# Patient Record
Sex: MALE | Race: WHITE | NOT HISPANIC OR LATINO | Employment: OTHER | URBAN - METROPOLITAN AREA
[De-identification: names, ages, dates, MRNs, and addresses within clinical notes are randomized per-mention and may not be internally consistent; named-entity substitution may affect disease eponyms.]

---

## 2019-07-08 ENCOUNTER — HOSPITAL ENCOUNTER (OUTPATIENT)
Facility: CLINIC | Age: 44
Setting detail: OBSERVATION
Discharge: HOME | End: 2019-07-10
Attending: EMERGENCY MEDICINE | Admitting: SURGERY
Payer: COMMERCIAL

## 2019-07-08 ENCOUNTER — APPOINTMENT (OUTPATIENT)
Dept: CT IMAGING | Facility: CLINIC | Age: 44
End: 2019-07-08
Attending: EMERGENCY MEDICINE
Payer: COMMERCIAL

## 2019-07-08 DIAGNOSIS — K40.90 INGUINAL HERNIA OF RIGHT SIDE WITHOUT OBSTRUCTION OR GANGRENE: Primary | ICD-10-CM

## 2019-07-08 DIAGNOSIS — K40.90 NON-RECURRENT UNILATERAL INGUINAL HERNIA WITHOUT OBSTRUCTION OR GANGRENE: ICD-10-CM

## 2019-07-08 LAB
ALBUMIN SERPL-MCNC: 4.5 G/DL (ref 3.4–5)
ALP SERPL-CCNC: 58 U/L (ref 40–150)
ALT SERPL W P-5'-P-CCNC: 32 U/L (ref 0–70)
ANION GAP SERPL CALCULATED.3IONS-SCNC: 7 MMOL/L (ref 3–14)
AST SERPL W P-5'-P-CCNC: 14 U/L (ref 0–45)
BASOPHILS # BLD AUTO: 0 10E9/L (ref 0–0.2)
BASOPHILS NFR BLD AUTO: 0.1 %
BILIRUB SERPL-MCNC: 1.1 MG/DL (ref 0.2–1.3)
BUN SERPL-MCNC: 24 MG/DL (ref 7–30)
CALCIUM SERPL-MCNC: 9.6 MG/DL (ref 8.5–10.1)
CHLORIDE SERPL-SCNC: 105 MMOL/L (ref 94–109)
CO2 SERPL-SCNC: 26 MMOL/L (ref 20–32)
CREAT SERPL-MCNC: 0.86 MG/DL (ref 0.66–1.25)
DIFFERENTIAL METHOD BLD: NORMAL
EOSINOPHIL # BLD AUTO: 0 10E9/L (ref 0–0.7)
EOSINOPHIL NFR BLD AUTO: 0.2 %
ERYTHROCYTE [DISTWIDTH] IN BLOOD BY AUTOMATED COUNT: 13.1 % (ref 10–15)
GFR SERPL CREATININE-BSD FRML MDRD: >90 ML/MIN/{1.73_M2}
GLUCOSE SERPL-MCNC: 107 MG/DL (ref 70–99)
HCT VFR BLD AUTO: 46.7 % (ref 40–53)
HGB BLD-MCNC: 16.3 G/DL (ref 13.3–17.7)
IMM GRANULOCYTES # BLD: 0 10E9/L (ref 0–0.4)
IMM GRANULOCYTES NFR BLD: 0.2 %
LACTATE BLD-SCNC: 1.3 MMOL/L (ref 0.7–2)
LIPASE SERPL-CCNC: 135 U/L (ref 73–393)
LYMPHOCYTES # BLD AUTO: 0.8 10E9/L (ref 0.8–5.3)
LYMPHOCYTES NFR BLD AUTO: 8.3 %
MCH RBC QN AUTO: 30.7 PG (ref 26.5–33)
MCHC RBC AUTO-ENTMCNC: 34.9 G/DL (ref 31.5–36.5)
MCV RBC AUTO: 88 FL (ref 78–100)
MONOCYTES # BLD AUTO: 0.5 10E9/L (ref 0–1.3)
MONOCYTES NFR BLD AUTO: 5.5 %
NEUTROPHILS # BLD AUTO: 8.1 10E9/L (ref 1.6–8.3)
NEUTROPHILS NFR BLD AUTO: 85.7 %
NRBC # BLD AUTO: 0 10*3/UL
NRBC BLD AUTO-RTO: 0 /100
PLATELET # BLD AUTO: 282 10E9/L (ref 150–450)
POTASSIUM SERPL-SCNC: 3.4 MMOL/L (ref 3.4–5.3)
PROT SERPL-MCNC: 7.9 G/DL (ref 6.8–8.8)
RADIOLOGIST FLAGS: ABNORMAL
RBC # BLD AUTO: 5.31 10E12/L (ref 4.4–5.9)
SODIUM SERPL-SCNC: 138 MMOL/L (ref 133–144)
WBC # BLD AUTO: 9.5 10E9/L (ref 4–11)

## 2019-07-08 PROCEDURE — 25800030 ZZH RX IP 258 OP 636: Performed by: STUDENT IN AN ORGANIZED HEALTH CARE EDUCATION/TRAINING PROGRAM

## 2019-07-08 PROCEDURE — 25000132 ZZH RX MED GY IP 250 OP 250 PS 637: Performed by: EMERGENCY MEDICINE

## 2019-07-08 PROCEDURE — 86850 RBC ANTIBODY SCREEN: CPT | Performed by: SURGERY

## 2019-07-08 PROCEDURE — 74177 CT ABD & PELVIS W/CONTRAST: CPT

## 2019-07-08 PROCEDURE — 86901 BLOOD TYPING SEROLOGIC RH(D): CPT | Performed by: SURGERY

## 2019-07-08 PROCEDURE — G0378 HOSPITAL OBSERVATION PER HR: HCPCS

## 2019-07-08 PROCEDURE — 96374 THER/PROPH/DIAG INJ IV PUSH: CPT | Mod: 59

## 2019-07-08 PROCEDURE — 86900 BLOOD TYPING SEROLOGIC ABO: CPT | Performed by: SURGERY

## 2019-07-08 PROCEDURE — 83605 ASSAY OF LACTIC ACID: CPT | Performed by: EMERGENCY MEDICINE

## 2019-07-08 PROCEDURE — 85025 COMPLETE CBC W/AUTO DIFF WBC: CPT | Performed by: EMERGENCY MEDICINE

## 2019-07-08 PROCEDURE — 25000128 H RX IP 250 OP 636: Performed by: EMERGENCY MEDICINE

## 2019-07-08 PROCEDURE — 83690 ASSAY OF LIPASE: CPT | Performed by: EMERGENCY MEDICINE

## 2019-07-08 PROCEDURE — 25000125 ZZHC RX 250: Performed by: EMERGENCY MEDICINE

## 2019-07-08 PROCEDURE — 80053 COMPREHEN METABOLIC PANEL: CPT | Performed by: EMERGENCY MEDICINE

## 2019-07-08 PROCEDURE — 25000128 H RX IP 250 OP 636: Performed by: INTERNAL MEDICINE

## 2019-07-08 PROCEDURE — 99285 EMERGENCY DEPT VISIT HI MDM: CPT | Mod: Z6 | Performed by: EMERGENCY MEDICINE

## 2019-07-08 PROCEDURE — 99285 EMERGENCY DEPT VISIT HI MDM: CPT | Mod: 25

## 2019-07-08 RX ORDER — CHLORAL HYDRATE 500 MG
2 CAPSULE ORAL DAILY
COMMUNITY

## 2019-07-08 RX ORDER — HYDROMORPHONE HYDROCHLORIDE 1 MG/ML
0.5 INJECTION, SOLUTION INTRAMUSCULAR; INTRAVENOUS; SUBCUTANEOUS ONCE
Status: COMPLETED | OUTPATIENT
Start: 2019-07-08 | End: 2019-07-08

## 2019-07-08 RX ORDER — ACETAMINOPHEN 325 MG/1
650 TABLET ORAL EVERY 4 HOURS PRN
Status: DISCONTINUED | OUTPATIENT
Start: 2019-07-08 | End: 2019-07-10 | Stop reason: HOSPADM

## 2019-07-08 RX ORDER — HYDROCODONE BITARTRATE AND ACETAMINOPHEN 5; 325 MG/1; MG/1
1-2 TABLET ORAL EVERY 4 HOURS PRN
Status: DISCONTINUED | OUTPATIENT
Start: 2019-07-08 | End: 2019-07-10 | Stop reason: HOSPADM

## 2019-07-08 RX ORDER — KETOCONAZOLE 20 MG/G
CREAM TOPICAL 2 TIMES DAILY
COMMUNITY

## 2019-07-08 RX ORDER — LISINOPRIL AND HYDROCHLOROTHIAZIDE 12.5; 2 MG/1; MG/1
1 TABLET ORAL DAILY
COMMUNITY

## 2019-07-08 RX ORDER — SUCRALFATE 1 G/1
1 TABLET ORAL 4 TIMES DAILY
COMMUNITY

## 2019-07-08 RX ORDER — NALOXONE HYDROCHLORIDE 0.4 MG/ML
.1-.4 INJECTION, SOLUTION INTRAMUSCULAR; INTRAVENOUS; SUBCUTANEOUS
Status: DISCONTINUED | OUTPATIENT
Start: 2019-07-08 | End: 2019-07-10

## 2019-07-08 RX ORDER — SODIUM CHLORIDE, SODIUM LACTATE, POTASSIUM CHLORIDE, CALCIUM CHLORIDE 600; 310; 30; 20 MG/100ML; MG/100ML; MG/100ML; MG/100ML
INJECTION, SOLUTION INTRAVENOUS CONTINUOUS
Status: DISCONTINUED | OUTPATIENT
Start: 2019-07-08 | End: 2019-07-10 | Stop reason: HOSPADM

## 2019-07-08 RX ORDER — IOPAMIDOL 755 MG/ML
93 INJECTION, SOLUTION INTRAVASCULAR ONCE
Status: COMPLETED | OUTPATIENT
Start: 2019-07-08 | End: 2019-07-08

## 2019-07-08 RX ORDER — PANTOPRAZOLE SODIUM 40 MG/1
40 TABLET, DELAYED RELEASE ORAL DAILY
COMMUNITY

## 2019-07-08 RX ADMIN — LIDOCAINE HYDROCHLORIDE 30 ML: 20 SOLUTION ORAL; TOPICAL at 16:09

## 2019-07-08 RX ADMIN — HYDROMORPHONE HYDROCHLORIDE 0.5 MG: 1 INJECTION, SOLUTION INTRAMUSCULAR; INTRAVENOUS; SUBCUTANEOUS at 17:18

## 2019-07-08 RX ADMIN — SODIUM CHLORIDE, POTASSIUM CHLORIDE, SODIUM LACTATE AND CALCIUM CHLORIDE: 600; 310; 30; 20 INJECTION, SOLUTION INTRAVENOUS at 22:05

## 2019-07-08 RX ADMIN — IOPAMIDOL 93 ML: 755 INJECTION, SOLUTION INTRAVENOUS at 15:38

## 2019-07-08 ASSESSMENT — ENCOUNTER SYMPTOMS
APPETITE CHANGE: 1
COUGH: 0
ABDOMINAL PAIN: 1
VOMITING: 0
SHORTNESS OF BREATH: 0
LIGHT-HEADEDNESS: 1

## 2019-07-08 NOTE — ED PROVIDER NOTES
"    [place] EMERGENCY DEPARTMENT ([facility])  7/08/19   Hallway F 1:38 PM    History     Chief Complaint   Patient presents with     Abdominal Pain     The history is provided by the patient and medical records.     Isaías Fishman is a 43 year old male who presents with abdominal pain with lightheadedness. Patient notes he was recently diagnosed with GERD and esophagitis in April this year. He was started on omeprazole, pantoprazole and sucralfate for this but has had ongoing unbearable abdominal pain. He did have endoscopy on 5/22/19 at [facility] that showed esophagitis as well as small hiatal hernia. He continues to have reflux symptoms every other day. He came to the Emergency Department today because of bad abdominal pain making him nearly pass out, even while driving. He states it is embarrassing, \"it's acid reflux man, I should be able to handle it.\" He hasn't been able to eat due to abdominal discomfort. No known drug allergies. He states he has stopped drinking alcohol. No recreational drug use, no cigarette use.     Pt noted new swelling in the right groin over the weekend that resolved, then returned this morning when the pain became severe. No previous hx of inguinal hernias.     I have reviewed the Medications, Allergies, Past Medical and Surgical History, and Social History in the [employer] system.  PAST MEDICAL HISTORY:   Past Medical History:   Diagnosis Date     Hypertension        PAST SURGICAL HISTORY: History reviewed. No pertinent surgical history.    FAMILY HISTORY: History reviewed. No pertinent family history.    SOCIAL HISTORY:   Social History     Tobacco Use     Smoking status: Never Smoker   Substance Use Topics     Alcohol use: Not Currently     Comment: occasionally          No Known Allergies     Review of Systems   Constitutional: Positive for appetite change.   HENT: Negative.    Respiratory: Negative for cough and shortness of breath.    Cardiovascular: Negative for chest " pain.   Gastrointestinal: Positive for abdominal pain. Negative for vomiting.   Neurological: Positive for light-headedness.   All other systems reviewed and are negative.      Physical Exam   BP: 137/66  Pulse: (!) 36  Heart Rate: 82  Temp: 97.8  F (36.6  C)  Resp: 18  Weight: 67.9 kg (149 lb 11.2 oz)  SpO2: 97 %      Physical Exam  Gen:A&Ox3, no acute distress, sitting in wheelchair   HEENT:PERRL, no facial tenderness or wounds, head atraumatic, oropharynx clear, mucous membranes moist, TMs clear bilaterally  Neck:no bony tenderness or step offs, no JVD, trachea midline  Back: no CVA tenderness, no midline bony tenderness  CV:RRR without murmurs  PULM:Clear to auscultation bilaterally  Abd:soft tenderness at umbilicus and suprapubic area  : Large right inguinal hernia that is firm but without skin changes  UE:No traumatic injuries, skin normal  LE:no traumatic injuries, skin normal, no LE edema  Neuro: partial paralysis of the LE   Skin: no rashes or ecchymoses       ED Course        Procedures             Critical Care time:  none             Labs Ordered and Resulted from Time of ED Arrival Up to the Time of Departure from the ED   COMPREHENSIVE METABOLIC PANEL - Abnormal; Notable for the following components:       Result Value    Glucose 107 (*)     All other components within normal limits   CBC WITH PLATELETS DIFFERENTIAL   LACTIC ACID WHOLE BLOOD   LIPASE   ROUTINE UA WITH MICROSCOPIC REFLEX TO CULTURE   FREE WATER            Assessments & Plan (with Medical Decision Making)   43-year-old male with history of cerebral palsy and GERD presented with severe abdominal pain associated with groin swelling.  Physical exam does not suggest an acute testicular mass or torsion.   Large right inguinal hernia.   Attempt at reduction unsuccessful initially.  Treated with ice, Trendelenburg position, IV Dilaudid.   CT abdomen pelvis shows narrow necked inguinal hernia with herniation of the colon.  No significant bowel  wall findings of ischemia or free air.   CBC and conference metabolic panel normal.  Lactic acid 1.3.  Surgery was consulted and evaluated the patient in the emergency department.  His hernia was able to be reduced.  Admitted to the general surgery service with plan for operative repair tomorrow.    I have reviewed the nursing notes.    I have reviewed the findings, diagnosis, plan and need for follow up with the patient.       Medication List      There are no discharge medications for this visit.         Final diagnoses:   Non-recurrent unilateral inguinal hernia without obstruction or gangrene   I, Ruba Elena, am serving as a trained medical scribe to document services personally performed by Gia Bowie MD based on the provider's statements to me on July 8, 2019.  This document has been checked and approved by the attending provider.    I, Gia Bowie MD, was physically present and have reviewed and verified the accuracy of this note documented by Ruba Elena, medical scribe.       7/8/2019   [facility] [place], EMERGENCY DEPARTMENT    MD BABAK Roger Katrina Anne, MD  07/08/19 [id number]

## 2019-07-08 NOTE — LETTER
[address] [facility]  [address] [facility] [address]  Phone: [phone]    July 10, 2019        Isaías Fishman  [address]  [address]  [place] [address]          To whom it may concern:    RE: Isaías Fishman    Patient was seen and treated at [facility] and missed work. Patient is safe to return to work next week (07/15/2019).  Patient may return to work  with the following:  Light duty-unable to lift more than 10 pounds    Please contact me for questions or concerns.      Sincerely,    Rico Do MD  General Surgery       No name on file.

## 2019-07-08 NOTE — CONSULTS
Surgery Consultation Note    Isaías Fishman  MRN: [id number]  male  43 year old    Chief Complaint:  Right groin pain    HPI:  43 year old male with hx cerebral palsy and reducible right sided inguinal hernia , GERD, who presented with 1-2 days of groin pain. The pain is localized in right groin. He had an inguinal hernia, which was spontaneously reducible yesterday, however, this morning he had a recurrence of hernia and was irreducible. He is been having nausea but no emesis. His last BM was yesterday and has not passed gas since this morning. He has not been eating or drinking anything due to persistent groin pain. He denies any fevers, night sweats or chills. No chest pain, sob, constipation/diarrhea or urinary symptoms.  Of note, he was recently diagnosed with GERD and esophagitis (endoscopic evidence of esophagitis) for which he is taking PPIs.    There is no problem list on file for this patient.      Past Surgical History: History reviewed. No pertinent surgical history.    Past Medical History:   Past Medical History:   Diagnosis Date     Hypertension        Social History:   Social History     Tobacco Use     Smoking status: Never Smoker   Substance Use Topics     Alcohol use: Not Currently     Comment: occasionally       Family History: History reviewed. No pertinent family history.     Allergies: No Known Allergies    Active Medications:   No current outpatient medications on file.       ROS:  Otherwise negative    Physical Examination:   Vital Signs: /71   Pulse 76   Temp 97.8  F (36.6  C)   Resp 18   SpO2 99%   GEN: alert, anxious, mild distress  EENT: normal  Chest: NLB on room air, regular rate  Abdomen: soft, non-tender, mildly-distended  : right sided inguinal hernia in the scrotal sac. No defect or hernia noted on the left side.   Extremities: WWP, no edema    Labs/Imaging/Other:  Most Recent 3 CBC's:  Recent Labs   Lab Test 07/08/19  1353   WBC 9.5   HGB 16.3   MCV 88         Most Recent 3 BMP's:  Recent Labs   Lab Test 07/08/19  1353      POTASSIUM 3.4   CHLORIDE 105   CO2 26   BUN 24   CR 0.86   ANIONGAP 7   HÉCTOR 9.6   *     Most Recent 2 LFT's:  Recent Labs   Lab Test 07/08/19  1353   AST 14   ALT 32   ALKPHOS 58   BILITOTAL 1.1       Admitting Diagnosis:   1. Non-recurrent unilateral inguinal hernia without obstruction or gangrene        Assessment & Plan:  43 year old male hx of cerebral palsy and reducible right sided inguinal hernias presented with irreducible right inguinal hernia with CT findings concerning for incarcerated hernia involving transverse colon.     - Surgery was able to reduce the hernia at bedside.   - Admit to ED observation  - Surgery will attempt to do serial abdominal exams every 6 hrs  - Okay to have clears for now. NPO at midnight.   - OR tomorrow for surgical intervention open right inguinal hernia repair      D/w chief resident Dr. Georges who will discuss with staff Dr. Jose Alberto Do MD  General Surgery PGY-2  [phone]

## 2019-07-08 NOTE — ED NOTES
[facility] [place]   ED Nurse to Floor Handoff     Isaías Fishman is a 43 year old male who speaks English and lives alone,  in a home  They arrived in the ED by car from home    ED Chief Complaint: Abdominal Pain    ED Dx;   Final diagnoses:   Non-recurrent unilateral inguinal hernia without obstruction or gangrene         Needed?: No    Allergies: No Known Allergies.  Past Medical Hx:   Past Medical History:   Diagnosis Date     Hypertension       Baseline Mental status: WDL  Current Mental Status changes: at basesline    Infection present or suspected this encounter: no  Sepsis suspected: No  Isolation type: No active isolations     Activity level - Baseline/Home:  Wheelchair  Activity Level - Current:   Wheelchair    Bariatric equipment needed?: No    In the ED these meds were given:   Medications   lidocaine HCl (XYLOCAINE) 2 % 15 mL, alum & mag hydroxide-simethicone (MYLANTA ES/MAALOX  ES) 15 mL GI Cocktail (30 mLs Oral Given 7/8/19 1609)   sodium chloride (PF) 0.9% PF flush 73 mL (73 mLs Intravenous Given 7/8/19 1538)   iopamidol (ISOVUE-370) solution 93 mL (93 mLs Intravenous Given 7/8/19 1538)   HYDROmorphone (PF) (DILAUDID) injection 0.5 mg (0.5 mg Intravenous Given 7/8/19 1718)       Drips running?  No    Home pump  No    Current LDAs  Peripheral IV 07/08/19 Left Lower forearm (Active)   Site Assessment WDL 7/8/2019  1:53 PM   Line Status Saline locked 7/8/2019  1:53 PM   Number of days: 0       Labs results:   Labs Ordered and Resulted from Time of ED Arrival Up to the Time of Departure from the ED   COMPREHENSIVE METABOLIC PANEL - Abnormal; Notable for the following components:       Result Value    Glucose 107 (*)     All other components within normal limits   CBC WITH PLATELETS DIFFERENTIAL   LACTIC ACID WHOLE BLOOD   LIPASE   ROUTINE UA WITH MICROSCOPIC REFLEX TO CULTURE   FREE WATER       Imaging Studies:   Recent Results (from the past 24 hour(s))   CT  Abdomen Pelvis w Contrast   Result Value    Radiologist flags See impression (Urgent)    Narrative    EXAMINATION: CT ABDOMEN PELVIS W CONTRAST, 7/8/2019 3:49 PM    TECHNIQUE:  Helical CT images from the lung bases through the  symphysis pubis were obtained with IV contrast. Contrast dose:  iopamidol (ISOVUE-370) solution 93 mL    COMPARISON: None available    HISTORY: mid abdominal pain    FINDINGS:    There is a large right indirect inguinal hernia containing a  borderline dilated knuckle of transverse colon measuring up to 4.2 cm  as well as omentum. There is a small amount of ascites within the  hernia sac. There is feculent material with mucosal hyperenhancement  of the herniated loop. This loop of bowel appears to tether the  mesocolon as well as the gastric antrum/pylorus inferiorly is  demonstrated on series 3, image 28. The transition points of the  entrance and extra loops appear to be narrowed as demonstrated on  series 5, image 370. Hernia neck measures 3.1 cm in width. There are  no significantly dilated loops of bowel upstream from this bowel  herniation. Small amount of fluid seen in the hernia sac. No  pneumatosis.    Remainder of the stomach and colon are within normal limits. Small  bowel appears normal.    Liver: Normal parenchymal attenuation without focal mass.  Biliary system: Gallbladder is within normal limits. No intrahepatic  or extrahepatic biliary ductal dilatation.  Pancreas: No focal mass or dilation of the main pancreatic duct.  Spleen: Within normal limits.  Adrenal glands: Within normal limits.  Kidneys: No focal mass mass or hydronephrosis. Punctate calyceal tip  stone in the superior and interpolar regions of the right kidney.  Bladder: Within normal limits.  Reproductive organs: Within normal limits.  Appendix: Normal.  Lymph nodes: No intra-abdominal or pelvic lymphadenopathy.  Vasculature: Within normal limits.    Lung bases: Clear.    Bones and soft tissues: No suspicious soft  tissue mass or fluid  collection. No suspicious osseous lesion.      Impression    IMPRESSION:   1. Right-sided indirect inguinal hernia containing a loop of  transverse colon producing a significant amount of inferior tethering  of the mesocolon and small amount of fluid indicating inflammation.  Correlate for incarceration.    [Urgent Result: See impression]    Finding was identified on 7/8/2019 3:53 PM.     Dr. Witt was contacted by Dr. Hernandez at 7/8/2019 4:09 PM and  verbalized understanding of the urgent finding.     I have personally reviewed the examination and initial interpretation  and I agree with the findings.    QUITA CALLAHAN MD       Recent vital signs:   /71   Pulse 76   Temp 97.8  F (36.6  C)   Resp 18   SpO2 99%     [place] Coma Scale Score: 15 (07/08/19 1341)       Cardiac Rhythm: Normal Sinus  Pt needs tele? No  Skin/wound Issues: None    Code Status: Full Code    Pain control: good    Nausea control: good    Abnormal labs/tests/findings requiring intervention: CT shows inguinal hernia    Family present during ED course? No   Family Comments/Social Situation comments: Pt is wheelchair bound at baseline and is independent and capable in it.    Tasks needing completion: None    Kevon Rivera, RN    [phone] [facility]

## 2019-07-08 NOTE — ED TRIAGE NOTES
"acid reflux causing \"ripping\" abd pain     VSS    \"hurts so bad thought I was going to passout \"  "

## 2019-07-09 ENCOUNTER — ANESTHESIA (OUTPATIENT)
Dept: SURGERY | Facility: CLINIC | Age: 44
End: 2019-07-09
Payer: COMMERCIAL

## 2019-07-09 ENCOUNTER — ANESTHESIA EVENT (OUTPATIENT)
Dept: SURGERY | Facility: CLINIC | Age: 44
End: 2019-07-09
Payer: COMMERCIAL

## 2019-07-09 PROBLEM — K40.90 INGUINAL HERNIA OF RIGHT SIDE WITHOUT OBSTRUCTION OR GANGRENE: Status: ACTIVE | Noted: 2019-07-09

## 2019-07-09 LAB
ABO + RH BLD: NORMAL
ABO + RH BLD: NORMAL
ALBUMIN UR-MCNC: 10 MG/DL
APPEARANCE UR: CLEAR
BILIRUB UR QL STRIP: NEGATIVE
BLD GP AB SCN SERPL QL: NORMAL
BLOOD BANK CMNT PATIENT-IMP: NORMAL
COLOR UR AUTO: YELLOW
GLUCOSE BLDC GLUCOMTR-MCNC: 71 MG/DL (ref 70–99)
GLUCOSE UR STRIP-MCNC: NEGATIVE MG/DL
HGB UR QL STRIP: NEGATIVE
KETONES UR STRIP-MCNC: >150 MG/DL
LEUKOCYTE ESTERASE UR QL STRIP: NEGATIVE
MUCOUS THREADS #/AREA URNS LPF: PRESENT /LPF
NITRATE UR QL: NEGATIVE
PH UR STRIP: 6.5 PH (ref 5–7)
RBC #/AREA URNS AUTO: 1 /HPF (ref 0–2)
SOURCE: ABNORMAL
SP GR UR STRIP: 1.01 (ref 1–1.03)
SPECIMEN EXP DATE BLD: NORMAL
UROBILINOGEN UR STRIP-MCNC: NORMAL MG/DL (ref 0–2)
WBC #/AREA URNS AUTO: ABNORMAL /HPF (ref 0–5)

## 2019-07-09 PROCEDURE — C9290 INJ, BUPIVACAINE LIPOSOME: HCPCS | Performed by: ANESTHESIOLOGY

## 2019-07-09 PROCEDURE — 37000009 ZZH ANESTHESIA TECHNICAL FEE, EACH ADDTL 15 MIN: Performed by: SURGERY

## 2019-07-09 PROCEDURE — 25000566 ZZH SEVOFLURANE, EA 15 MIN: Performed by: SURGERY

## 2019-07-09 PROCEDURE — 25000132 ZZH RX MED GY IP 250 OP 250 PS 637: Performed by: STUDENT IN AN ORGANIZED HEALTH CARE EDUCATION/TRAINING PROGRAM

## 2019-07-09 PROCEDURE — 25000128 H RX IP 250 OP 636

## 2019-07-09 PROCEDURE — 25000128 H RX IP 250 OP 636: Performed by: NURSE ANESTHETIST, CERTIFIED REGISTERED

## 2019-07-09 PROCEDURE — 25000128 H RX IP 250 OP 636: Performed by: ANESTHESIOLOGY

## 2019-07-09 PROCEDURE — G0378 HOSPITAL OBSERVATION PER HR: HCPCS

## 2019-07-09 PROCEDURE — 00000146 ZZHCL STATISTIC GLUCOSE BY METER IP

## 2019-07-09 PROCEDURE — 27210794 ZZH OR GENERAL SUPPLY STERILE: Performed by: SURGERY

## 2019-07-09 PROCEDURE — 36000053 ZZH SURGERY LEVEL 2 EA 15 ADDTL MIN - UMMC: Performed by: SURGERY

## 2019-07-09 PROCEDURE — 71000015 ZZH RECOVERY PHASE 1 LEVEL 2 EA ADDTL HR: Performed by: SURGERY

## 2019-07-09 PROCEDURE — 25000125 ZZHC RX 250: Performed by: NURSE ANESTHETIST, CERTIFIED REGISTERED

## 2019-07-09 PROCEDURE — 37000008 ZZH ANESTHESIA TECHNICAL FEE, 1ST 30 MIN: Performed by: SURGERY

## 2019-07-09 PROCEDURE — 71000014 ZZH RECOVERY PHASE 1 LEVEL 2 FIRST HR: Performed by: SURGERY

## 2019-07-09 PROCEDURE — 25800030 ZZH RX IP 258 OP 636: Performed by: STUDENT IN AN ORGANIZED HEALTH CARE EDUCATION/TRAINING PROGRAM

## 2019-07-09 PROCEDURE — 88302 TISSUE EXAM BY PATHOLOGIST: CPT | Performed by: SURGERY

## 2019-07-09 PROCEDURE — 25800030 ZZH RX IP 258 OP 636: Performed by: ANESTHESIOLOGY

## 2019-07-09 PROCEDURE — 12000001 ZZH R&B MED SURG/OB UMMC

## 2019-07-09 PROCEDURE — 40000170 ZZH STATISTIC PRE-PROCEDURE ASSESSMENT II: Performed by: SURGERY

## 2019-07-09 PROCEDURE — C1781 MESH (IMPLANTABLE): HCPCS | Performed by: SURGERY

## 2019-07-09 PROCEDURE — 36000051 ZZH SURGERY LEVEL 2 1ST 30 MIN - UMMC: Performed by: SURGERY

## 2019-07-09 PROCEDURE — 81001 URINALYSIS AUTO W/SCOPE: CPT | Performed by: EMERGENCY MEDICINE

## 2019-07-09 DEVICE — MESH KNITTED POLYPROPYLENE 06X6" MARLEX 0112720: Type: IMPLANTABLE DEVICE | Site: GROIN | Status: FUNCTIONAL

## 2019-07-09 RX ORDER — GLYCOPYRROLATE 0.2 MG/ML
INJECTION, SOLUTION INTRAMUSCULAR; INTRAVENOUS PRN
Status: DISCONTINUED | OUTPATIENT
Start: 2019-07-09 | End: 2019-07-09

## 2019-07-09 RX ORDER — OXYCODONE HYDROCHLORIDE 5 MG/1
5-10 TABLET ORAL
Status: DISCONTINUED | OUTPATIENT
Start: 2019-07-09 | End: 2019-07-10 | Stop reason: HOSPADM

## 2019-07-09 RX ORDER — ONDANSETRON 4 MG/1
4 TABLET, ORALLY DISINTEGRATING ORAL EVERY 30 MIN PRN
Status: DISCONTINUED | OUTPATIENT
Start: 2019-07-09 | End: 2019-07-09 | Stop reason: HOSPADM

## 2019-07-09 RX ORDER — ONDANSETRON 2 MG/ML
INJECTION INTRAMUSCULAR; INTRAVENOUS PRN
Status: DISCONTINUED | OUTPATIENT
Start: 2019-07-09 | End: 2019-07-09

## 2019-07-09 RX ORDER — ONDANSETRON 4 MG/1
4 TABLET, ORALLY DISINTEGRATING ORAL EVERY 6 HOURS PRN
Status: DISCONTINUED | OUTPATIENT
Start: 2019-07-09 | End: 2019-07-10 | Stop reason: HOSPADM

## 2019-07-09 RX ORDER — MEPERIDINE HYDROCHLORIDE 25 MG/ML
12.5 INJECTION INTRAMUSCULAR; INTRAVENOUS; SUBCUTANEOUS
Status: DISCONTINUED | OUTPATIENT
Start: 2019-07-09 | End: 2019-07-09 | Stop reason: HOSPADM

## 2019-07-09 RX ORDER — FENTANYL CITRATE 50 UG/ML
25-50 INJECTION, SOLUTION INTRAMUSCULAR; INTRAVENOUS
Status: DISCONTINUED | OUTPATIENT
Start: 2019-07-09 | End: 2019-07-09 | Stop reason: HOSPADM

## 2019-07-09 RX ORDER — NALOXONE HYDROCHLORIDE 0.4 MG/ML
.1-.4 INJECTION, SOLUTION INTRAMUSCULAR; INTRAVENOUS; SUBCUTANEOUS
Status: DISCONTINUED | OUTPATIENT
Start: 2019-07-09 | End: 2019-07-09 | Stop reason: HOSPADM

## 2019-07-09 RX ORDER — CEFAZOLIN SODIUM 2 G/100ML
2 INJECTION, SOLUTION INTRAVENOUS
Status: DISCONTINUED | OUTPATIENT
Start: 2019-07-09 | End: 2019-07-10 | Stop reason: HOSPADM

## 2019-07-09 RX ORDER — CEFAZOLIN SODIUM 2 G/100ML
INJECTION, SOLUTION INTRAVENOUS PRN
Status: DISCONTINUED | OUTPATIENT
Start: 2019-07-09 | End: 2019-07-09

## 2019-07-09 RX ORDER — ACETAMINOPHEN 325 MG/1
975 TABLET ORAL EVERY 8 HOURS
Status: DISCONTINUED | OUTPATIENT
Start: 2019-07-09 | End: 2019-07-10 | Stop reason: HOSPADM

## 2019-07-09 RX ORDER — ONDANSETRON 2 MG/ML
4 INJECTION INTRAMUSCULAR; INTRAVENOUS EVERY 6 HOURS PRN
Status: DISCONTINUED | OUTPATIENT
Start: 2019-07-09 | End: 2019-07-10 | Stop reason: HOSPADM

## 2019-07-09 RX ORDER — HYDROCODONE BITARTRATE AND ACETAMINOPHEN 5; 325 MG/1; MG/1
1 TABLET ORAL EVERY 4 HOURS PRN
Qty: 10 TABLET | Refills: 0 | Status: SHIPPED | OUTPATIENT
Start: 2019-07-09

## 2019-07-09 RX ORDER — FLUMAZENIL 0.1 MG/ML
0.2 INJECTION, SOLUTION INTRAVENOUS
Status: DISCONTINUED | OUTPATIENT
Start: 2019-07-09 | End: 2019-07-09 | Stop reason: HOSPADM

## 2019-07-09 RX ORDER — DEXAMETHASONE SODIUM PHOSPHATE 10 MG/ML
INJECTION, SOLUTION INTRAMUSCULAR; INTRAVENOUS PRN
Status: DISCONTINUED | OUTPATIENT
Start: 2019-07-09 | End: 2019-07-09

## 2019-07-09 RX ORDER — ONDANSETRON 2 MG/ML
4 INJECTION INTRAMUSCULAR; INTRAVENOUS EVERY 30 MIN PRN
Status: DISCONTINUED | OUTPATIENT
Start: 2019-07-09 | End: 2019-07-09 | Stop reason: HOSPADM

## 2019-07-09 RX ORDER — FENTANYL CITRATE 50 UG/ML
INJECTION, SOLUTION INTRAMUSCULAR; INTRAVENOUS PRN
Status: DISCONTINUED | OUTPATIENT
Start: 2019-07-09 | End: 2019-07-09

## 2019-07-09 RX ORDER — LIDOCAINE HYDROCHLORIDE 20 MG/ML
INJECTION, SOLUTION INFILTRATION; PERINEURAL PRN
Status: DISCONTINUED | OUTPATIENT
Start: 2019-07-09 | End: 2019-07-09

## 2019-07-09 RX ORDER — LIDOCAINE 40 MG/G
CREAM TOPICAL
Status: DISCONTINUED | OUTPATIENT
Start: 2019-07-09 | End: 2019-07-10 | Stop reason: HOSPADM

## 2019-07-09 RX ORDER — PROPOFOL 10 MG/ML
INJECTION, EMULSION INTRAVENOUS PRN
Status: DISCONTINUED | OUTPATIENT
Start: 2019-07-09 | End: 2019-07-09

## 2019-07-09 RX ORDER — CEFAZOLIN SODIUM 1 G/3ML
1 INJECTION, POWDER, FOR SOLUTION INTRAMUSCULAR; INTRAVENOUS SEE ADMIN INSTRUCTIONS
Status: DISCONTINUED | OUTPATIENT
Start: 2019-07-09 | End: 2019-07-10 | Stop reason: HOSPADM

## 2019-07-09 RX ORDER — MICONAZOLE NITRATE 20 MG/G
CREAM TOPICAL 2 TIMES DAILY PRN
Status: DISCONTINUED | OUTPATIENT
Start: 2019-07-09 | End: 2019-07-10 | Stop reason: HOSPADM

## 2019-07-09 RX ORDER — HYDROMORPHONE HYDROCHLORIDE 1 MG/ML
.3-.5 INJECTION, SOLUTION INTRAMUSCULAR; INTRAVENOUS; SUBCUTANEOUS EVERY 10 MIN PRN
Status: DISCONTINUED | OUTPATIENT
Start: 2019-07-09 | End: 2019-07-09 | Stop reason: HOSPADM

## 2019-07-09 RX ORDER — KETOCONAZOLE 20 MG/G
CREAM TOPICAL 2 TIMES DAILY PRN
Status: DISCONTINUED | OUTPATIENT
Start: 2019-07-09 | End: 2019-07-09

## 2019-07-09 RX ORDER — SODIUM CHLORIDE, SODIUM LACTATE, POTASSIUM CHLORIDE, CALCIUM CHLORIDE 600; 310; 30; 20 MG/100ML; MG/100ML; MG/100ML; MG/100ML
INJECTION, SOLUTION INTRAVENOUS CONTINUOUS
Status: DISCONTINUED | OUTPATIENT
Start: 2019-07-09 | End: 2019-07-09 | Stop reason: HOSPADM

## 2019-07-09 RX ORDER — BUPIVACAINE HYDROCHLORIDE 2.5 MG/ML
INJECTION, SOLUTION EPIDURAL; INFILTRATION; INTRACAUDAL PRN
Status: DISCONTINUED | OUTPATIENT
Start: 2019-07-09 | End: 2019-07-09

## 2019-07-09 RX ORDER — NALOXONE HYDROCHLORIDE 0.4 MG/ML
.1-.4 INJECTION, SOLUTION INTRAMUSCULAR; INTRAVENOUS; SUBCUTANEOUS
Status: DISCONTINUED | OUTPATIENT
Start: 2019-07-09 | End: 2019-07-10 | Stop reason: HOSPADM

## 2019-07-09 RX ADMIN — FENTANYL CITRATE 50 MCG: 50 INJECTION INTRAMUSCULAR; INTRAVENOUS at 12:51

## 2019-07-09 RX ADMIN — HYDROMORPHONE HYDROCHLORIDE 0.5 MG: 1 INJECTION, SOLUTION INTRAMUSCULAR; INTRAVENOUS; SUBCUTANEOUS at 13:15

## 2019-07-09 RX ADMIN — BUPIVACAINE 20 ML: 13.3 INJECTION, SUSPENSION, LIPOSOMAL INFILTRATION at 09:20

## 2019-07-09 RX ADMIN — SODIUM CHLORIDE, POTASSIUM CHLORIDE, SODIUM LACTATE AND CALCIUM CHLORIDE: 600; 310; 30; 20 INJECTION, SOLUTION INTRAVENOUS at 07:43

## 2019-07-09 RX ADMIN — FENTANYL CITRATE 50 MCG: 50 INJECTION INTRAMUSCULAR; INTRAVENOUS at 12:45

## 2019-07-09 RX ADMIN — SODIUM CHLORIDE, POTASSIUM CHLORIDE, SODIUM LACTATE AND CALCIUM CHLORIDE: 600; 310; 30; 20 INJECTION, SOLUTION INTRAVENOUS at 21:30

## 2019-07-09 RX ADMIN — SODIUM CHLORIDE, POTASSIUM CHLORIDE, SODIUM LACTATE AND CALCIUM CHLORIDE 100 ML/HR: 600; 310; 30; 20 INJECTION, SOLUTION INTRAVENOUS at 12:30

## 2019-07-09 RX ADMIN — ONDANSETRON 4 MG: 2 INJECTION INTRAMUSCULAR; INTRAVENOUS at 09:56

## 2019-07-09 RX ADMIN — GLYCOPYRROLATE 0.1 MG: 0.2 INJECTION, SOLUTION INTRAMUSCULAR; INTRAVENOUS at 10:20

## 2019-07-09 RX ADMIN — FENTANYL CITRATE 50 MCG: 50 INJECTION, SOLUTION INTRAMUSCULAR; INTRAVENOUS at 12:15

## 2019-07-09 RX ADMIN — MIDAZOLAM 1 MG: 1 INJECTION INTRAMUSCULAR; INTRAVENOUS at 09:38

## 2019-07-09 RX ADMIN — ROCURONIUM BROMIDE 50 MG: 10 INJECTION INTRAVENOUS at 09:38

## 2019-07-09 RX ADMIN — ACETAMINOPHEN 975 MG: 325 TABLET, FILM COATED ORAL at 14:48

## 2019-07-09 RX ADMIN — LIDOCAINE HYDROCHLORIDE 100 MG: 20 INJECTION, SOLUTION INFILTRATION; PERINEURAL at 09:38

## 2019-07-09 RX ADMIN — OXYCODONE HYDROCHLORIDE 5 MG: 5 TABLET ORAL at 18:00

## 2019-07-09 RX ADMIN — OXYCODONE HYDROCHLORIDE 5 MG: 5 TABLET ORAL at 14:48

## 2019-07-09 RX ADMIN — HYDROMORPHONE HYDROCHLORIDE 0.5 MG: 1 INJECTION, SOLUTION INTRAMUSCULAR; INTRAVENOUS; SUBCUTANEOUS at 13:00

## 2019-07-09 RX ADMIN — FENTANYL CITRATE 50 MCG: 50 INJECTION INTRAMUSCULAR; INTRAVENOUS at 09:08

## 2019-07-09 RX ADMIN — MIDAZOLAM 1 MG: 1 INJECTION INTRAMUSCULAR; INTRAVENOUS at 09:08

## 2019-07-09 RX ADMIN — CEFAZOLIN SODIUM 2 G: 2 INJECTION, SOLUTION INTRAVENOUS at 09:55

## 2019-07-09 RX ADMIN — BUPIVACAINE HYDROCHLORIDE 20 ML: 2.5 INJECTION, SOLUTION EPIDURAL; INFILTRATION; INTRACAUDAL; PERINEURAL at 09:20

## 2019-07-09 RX ADMIN — SUGAMMADEX 140 MG: 100 INJECTION, SOLUTION INTRAVENOUS at 12:10

## 2019-07-09 RX ADMIN — MIDAZOLAM 1 MG: 1 INJECTION INTRAMUSCULAR; INTRAVENOUS at 09:32

## 2019-07-09 RX ADMIN — PROPOFOL 140 MG: 10 INJECTION, EMULSION INTRAVENOUS at 09:38

## 2019-07-09 RX ADMIN — DEXAMETHASONE SODIUM PHOSPHATE 4 MG: 10 INJECTION, SOLUTION INTRAMUSCULAR; INTRAVENOUS at 09:56

## 2019-07-09 RX ADMIN — ACETAMINOPHEN 975 MG: 325 TABLET, FILM COATED ORAL at 21:49

## 2019-07-09 RX ADMIN — FENTANYL CITRATE 100 MCG: 50 INJECTION, SOLUTION INTRAMUSCULAR; INTRAVENOUS at 09:38

## 2019-07-09 RX ADMIN — SODIUM CHLORIDE, POTASSIUM CHLORIDE, SODIUM LACTATE AND CALCIUM CHLORIDE: 600; 310; 30; 20 INJECTION, SOLUTION INTRAVENOUS at 12:23

## 2019-07-09 ASSESSMENT — ACTIVITIES OF DAILY LIVING (ADL)
ADLS_ACUITY_SCORE: 15
ADLS_ACUITY_SCORE: 15

## 2019-07-09 NOTE — PLAN OF CARE
Pt transferred to  via transport and cart. Alert, oriented, pleasant. BS 71. Transfer to cart per self with Ax1. Belongings kept at bedside on 7  B.

## 2019-07-09 NOTE — PLAN OF CARE
Temp: 96  F (35.6  C) Temp src: Oral BP: 117/53 Pulse: 92 Heart Rate: 111 Resp: 16 SpO2: 97 % O2 Device: Nasal cannula Oxygen Delivery: 2 LPM    Status: POD 0 R inguinal hernia repair  Neuro: alert, oriented, hx of CP  GI/:  voiding per urinal, BS hypoactive  Incisions/Drains: RLQ incision intact with dermabond  IV Access: PIV with LR at 100  Labs: --  Pain: prn oxycodone and scheduled tylenol adequate for lower abd pain  Diet: tolerating fulls  Activity: Ax1 to pivot, ambulated with walker to room door and back with Ax1  New changes this shift: post-op at about 1400, post op VSS- did not finish d/t sitting up in WC with brother-weaned to RA and satting high 90s, doing well  Plan:  possible discharge tomorrow, script sent to pharmacy

## 2019-07-09 NOTE — ANESTHESIA POSTPROCEDURE EVALUATION
Anesthesia POST Procedure Evaluation    Patient: Isaías Fishman   MRN:     [id number] Gender:   male   Age:    43 year old :      1975        Preoperative Diagnosis: right inguinal hernia   Procedure(s):  Right Open Inguinal Hernia Repair with Mesh   Postop Comments: No value filed.       Anesthesia Type:  General  No value filed.    Reportable Event: NO     PAIN: Uncomplicated   Sign Out status: Comfortable, Well controlled pain     PONV: No PONV   Sign Out status:  No Nausea or Vomiting     Neuro/Psych: Uneventful perioperative course   Sign Out Status: Preoperative baseline; Age appropriate mentation     Airway/Resp.: Uneventful perioperative course   Sign Out Status: Non labored breathing, age appropriate RR; Resp. Status within EXPECTED Parameters     CV: Uneventful perioperative course   Sign Out status: Appropriate BP and perfusion indices; Appropriate HR/Rhythm     Disposition:   Sign Out in:  PACU  Disposition:  Phase II; Home  Recovery Course: Uneventful  Follow-Up: Not required           Last Anesthesia Record Vitals:  CRNA VITALS  2019 1153 - 2019 1234      2019             Resp Rate (observed):  2  (Abnormal)           Last PACU Vitals:  Vitals Value Taken Time   /90 2019 12:30 PM   Temp     Pulse 73 2019 12:30 PM   Resp     SpO2 96 % 2019 12:33 PM   Temp src     NIBP     Pulse     SpO2     Resp     Temp     Ht Rate     Temp 2     Vitals shown include unvalidated device data.      Electronically Signed By: Annalisa Almazan MD, 2019, 12:34 PM

## 2019-07-09 NOTE — ANESTHESIA CARE TRANSFER NOTE
Patient: Isaías Fishman    Procedure(s):  Right Open Inguinal Hernia Repair with Mesh    Diagnosis: right inguinal hernia  Diagnosis Additional Information: No value filed.    Anesthesia Type:   No value filed.     Note:  Airway :Face Mask  Patient transferred to:PACU  Comments: VSS.  Report given to RN.Handoff Report: Identifed the Patient, Identified the Reponsible Provider, Reviewed the pertinent medical history, Discussed the surgical course, Reviewed Intra-OP anesthesia mangement and issues during anesthesia, Set expectations for post-procedure period and Allowed opportunity for questions and acknowledgement of understanding      Vitals: (Last set prior to Anesthesia Care Transfer)    CRNA VITALS  7/9/2019 1153 - 7/9/2019 1228      7/9/2019             Resp Rate (observed):  Sat  HR  BP 12  99  91  133/97                Electronically Signed By: JERE Sandhu CRNA  July 9, 2019  12:28 PM

## 2019-07-09 NOTE — PROVIDER NOTIFICATION
Pt is admitted as an outpatient observation,observation information and hand hygiene information given to patient,patient verbalized understanding of informations.Denied questions.Provider was text page for observation goals,no goals written or call back as of end of my shift..

## 2019-07-09 NOTE — PLAN OF CARE
Patient was admitted through ED with  lower abdominal pain,recurrent right inguinal hernia that was reducible yesterday  but  became was irreducible this morning.Abdominal CT showed incarcerated hernia involving transverse colon.History of Cerebral palsy,GERD and esophagitis.Patient alert and oriented x 4,vitals stable,denied pain ,denied nausea or any other  discomfort.Moves all extremities equally,weakness bilateral lower extremity,pt is wheel chair bound.Patient seen by provider,pt is NPO except med,for OR  intervention tomorrow LR infusion at 100 ml/hr ordered.Pt need  to give urine sample for UA and micro .Continue per plan of care.

## 2019-07-09 NOTE — PHARMACY-ADMISSION MEDICATION HISTORY
"Admission medication history interview status for the 7/8/2019 admission is complete. See Epic admission navigator for allergy information, pharmacy, prior to admission medications and immunization status.     Medication history interview sources:  The patient (Isaías)    Changes made to PTA medication list (reason)  Added:     Ketoconazole 2% cream    Lisinopril-hydrochlorothiazide 20-12.5mg    Zeasorb (miconazole) powder    Multivitamins    Pantoprazole 40mg EC    Sucralfate 1g    Deleted: None.   Changed: None.     Additional medication history information (including reliability of information, actions taken by pharmacist):    The patient seems to be a reliable historian and is very serious about taking his medications. According to outside medication information found in Epic, he doesn't seem to miss any doses of his Lisinopril-hydrochlorothiazide, pantoprazole, or sucralfate.  The patient also stated he takes a daily \"ISAGENIX\" multivitamin.  The patient also states he often mixes his ketoconazole cream and miconazole powder together, but will use each separate depending on his jock itch symptoms.       Prior to Admission medications    Medication Sig Last Dose Taking? Auth Provider   fish oil-omega-3 fatty acids 1000 MG capsule Take 2 g by mouth daily Take 2 capsules (2000mg) by mouth once daily. 7/8/2019 at AM Yes Unknown, Entered By History   ketoconazole (NIZORAL) 2 % external cream Apply topically 2 times daily Apply topically 1-2 times daily as needed. Use alone or in combination with Zeasorb (miconazole powder) as needed. 7/8/2019 at AM Yes Unknown, Entered By History   lisinopril-hydrochlorothiazide (PRINZIDE/ZESTORETIC) 20-12.5 MG tablet Take 1 tablet by mouth daily 7/8/2019 at AM Yes Unknown, Entered By History   miconazole (MICATIN/MICRO GUARD) 2 % external powder Apply topically as needed Use topically 1-2 times daily as needed alone or in combination with ketoconazole cream as needed. 7/8/2019 at " AM Yes Unknown, Entered By History   Multiple Vitamins-Minerals (MULTIVITAMIN ADULT PO) Take 1 tablet by mouth daily 7/8/2019 at AM Yes Unknown, Entered By History   pantoprazole (PROTONIX) 40 MG EC tablet Take 40 mg by mouth daily 7/8/2019 at AM Yes Unknown, Entered By History   sucralfate (CARAFATE) 1 GM tablet Take 1 g by mouth 4 times daily 7/8/2019 at AM Yes Unknown, Entered By History         Medication history completed by:   Lorna Gaviria   Student Pharmacist  [facility]

## 2019-07-09 NOTE — PROGRESS NOTES
Surgery Progress Note  07/09/2019       Subjective:  - TA overnight.  Serial abdominal exams stable.  - no pain, feels well     Objective:  Temp:  [97.2  F (36.2  C)-98.5  F (36.9  C)] 97.2  F (36.2  C)  Pulse:  [36-76] 76  Heart Rate:  [66-82] 69  Resp:  [18] 18  BP: (119-137)/(58-78) 119/69  SpO2:  [97 %-99 %] 98 %    No intake/output data recorded.      Gen: Awake, alert, NAD  Resp: NLB on RA  Abd:soft ntnd  Ext: WWP, no edema     Labs:  Recent Labs   Lab 07/08/19  1353   WBC 9.5   HGB 16.3          Recent Labs   Lab 07/08/19  1353      POTASSIUM 3.4   CHLORIDE 105   CO2 26   BUN 24   CR 0.86   *   HÉCTOR 9.6       Imaging:  None new     Assessment/Plan:   43 year old male hx of cerebral palsy and reducible right sided inguinal hernias presented with irreducible right inguinal hernia with CT findings concerning for incarcerated hernia involving transverse colon.  Hernia reduced at bedside in ED    - OR today for right inguinal hernia repair    Seen, examined, and discussed with chief resident, who will discuss with staff.    Josie Cantrell MD  Surgery Resident PGY-1  Pg [phone]

## 2019-07-09 NOTE — PROGRESS NOTES
POST OP CHECK  07/09/2019    Isaías Fishman is a 43 year old male with h/o cerebral palsy and previously reducible R inguinal hernias now POD #0 s/p open R inguinal herniorrhaphy.      Pt reports pain is controlled. No new issues.  Questions about risk of recurrence and activity restrictions.  Tolerating food, has not urinated or passed flatus yet.    /66 (BP Location: Right arm)   Pulse 92   Temp 96  F (35.6  C) (Oral)   Resp 13   Wt 67.9 kg (149 lb 11.2 oz)   SpO2 93%   BMI 25.70 kg/m    General: Alert, interactive, & in NAD  Resp: CTAB, no crackles or wheezes  Cardiac: Regular rate; extremities warm  Abdomen: Soft, nontender, nondistended  Incision: c/d/i withouth erythema, warmth, or discharge.   Extremities: No LE edema or obvious joint abnormalities    EBL minimal      A/P: No acute post-op issues. Continue plan of care per primary team. Please call with questions.     Josie Cantrell MD  Surgery Resident PGY-1  Pg [phone]

## 2019-07-09 NOTE — ANESTHESIA PREPROCEDURE EVALUATION
Anesthesia Pre-Procedure Evaluation    Patient: Isaías Fishman   MRN:     [id number] Gender:   male   Age:    43 year old :      1975        Preoperative Diagnosis: right inguinal hernia   Procedure(s):  Right Open Inguinal Hernia Repair     Past Medical History:   Diagnosis Date     Hypertension       History reviewed. No pertinent surgical history.       Anesthesia Evaluation     . Pt has not had prior anesthetic            ROS/MED HX    ENT/Pulmonary:  - neg pulmonary ROS     Neurologic:  - neg neurologic ROS     Cardiovascular:     (+) hypertension----. : . . . :. .       METS/Exercise Tolerance:     Hematologic:  - neg hematologic  ROS       Musculoskeletal:  - neg musculoskeletal ROS       GI/Hepatic:  - neg GI/hepatic ROS       Renal/Genitourinary:  - ROS Renal section negative       Endo:  - neg endo ROS       Psychiatric:  - neg psychiatric ROS       Infectious Disease:  - neg infectious disease ROS       Malignancy:      - no malignancy   Other:    (+) No chance of pregnancy C-spine cleared: N/A, no H/O Chronic Pain,  - neg other ROS                     PHYSICAL EXAM:   Mental Status/Neuro: A/A/O   Airway: Facies: Feasible  Mallampati: I  Mouth/Opening: Full  TM distance: > 6 cm  Neck ROM: Full   Respiratory: Auscultation: CTAB     Resp. Rate: Normal     Resp. Effort: Normal      CV: Rhythm: Regular  Rate: Age appropriate  Heart: Normal Sounds   Comments:      Dental: Normal                  Lab Results   Component Value Date    WBC 9.5 2019    HGB 16.3 2019    HCT 46.7 2019     2019    CRP <0.02 2005    SED 3 2005     2019    POTASSIUM 3.4 2019    CHLORIDE 105 2019    CO2 26 2019    BUN 24 2019    CR 0.86 2019     (H) 2019    HÉCTOR 9.6 2019    ALBUMIN 4.5 2019    PROTTOTAL 7.9 2019    ALT 32 2019    AST 14 2019    ALKPHOS 58 2019    BILITOTAL 1.1 2019    LIPASE  "135 07/08/2019    TSH 2.39 01/31/2005       Preop Vitals  BP Readings from Last 3 Encounters:   07/09/19 119/69   06/08/16 146/89    Pulse Readings from Last 3 Encounters:   07/08/19 76      Resp Readings from Last 3 Encounters:   07/09/19 18   06/08/16 16    SpO2 Readings from Last 3 Encounters:   07/09/19 98%   06/08/16 100%      Temp Readings from Last 1 Encounters:   07/09/19 36.2  C (97.2  F) (Oral)    Ht Readings from Last 1 Encounters:   06/08/16 1.626 m (5' 4\")      Wt Readings from Last 1 Encounters:   07/08/19 67.9 kg (149 lb 11.2 oz)    Estimated body mass index is 25.7 kg/m  as calculated from the following:    Height as of 6/8/16: 1.626 m (5' 4\").    Weight as of this encounter: 67.9 kg (149 lb 11.2 oz).     LDA:  Peripheral IV 07/08/19 Left Lower forearm (Active)   Site Assessment WDL 7/9/2019 12:00 AM   Line Status Infusing 7/9/2019 12:00 AM   Phlebitis Scale 0-->no symptoms 7/9/2019 12:00 AM   Infiltration Scale 0 7/9/2019 12:00 AM   Extravasation? No 7/9/2019 12:00 AM   Number of days: 1            Assessment:   ASA SCORE: 2 emergent     NPO Status: > 2 hours since completed Clear Liquids   Documentation: H&P complete; Consents complete   Proceeding: Proceed without further delay  Tobacco Use:  NO Active use of Tobacco/UNKNOWN Tobacco use status     Plan:   Anes. Type:  General   Pre-Induction: Midazolam IV; Acetaminophen PO   Induction:  IV (Standard)   Airway: Oral ETT   Access/Monitoring: PIV   Maintenance: Balanced   Emergence: Procedure Site   Logistics: Same Day Surgery     Postop Pain/Sedation Strategy:  Standard-Options: Opioids PRN     PONV Management:  Adult Risk Factors:, Non-Smoker, Postop Opioids  Prevention: Ondansetron     CONSENT: Direct conversation   Plan and risks discussed with: Patient   Blood Products: Consented (ALL Blood Products)                         Annalisa Almazan MD  "

## 2019-07-09 NOTE — ANESTHESIA POSTPROCEDURE EVALUATION
Anesthesia POST Procedure Evaluation    Patient: Isaías Fishman   MRN:     [id number] Gender:   male   Age:    43 year old :      1975        Preoperative Diagnosis: right inguinal hernia   Procedure(s):  Right Open Inguinal Hernia Repair with Mesh   Postop Comments: No value filed.       Anesthesia Type:  General  No value filed.    Reportable Event: NO     PAIN: Uncomplicated   Sign Out status: Comfortable, Well controlled pain     PONV: No PONV   Sign Out status:  No Nausea or Vomiting     Neuro/Psych: Uneventful perioperative course   Sign Out Status: Preoperative baseline; Age appropriate mentation     Airway/Resp.: Uneventful perioperative course   Sign Out Status: Non labored breathing, age appropriate RR; Resp. Status within EXPECTED Parameters     CV: Uneventful perioperative course   Sign Out status: Appropriate BP and perfusion indices; Appropriate HR/Rhythm     Disposition:   Sign Out in:  PACU  Disposition:  Phase II; Home  Recovery Course: Uneventful  Follow-Up: Not required           Last Anesthesia Record Vitals:  CRNA VITALS  2019 1153 - 2019 1235      2019             Resp Rate (observed):  2  (Abnormal)           Last PACU Vitals:  Vitals Value Taken Time   /90 2019 12:30 PM   Temp     Pulse 73 2019 12:30 PM   Resp     SpO2 97 % 2019 12:34 PM   Temp src     NIBP     Pulse     SpO2     Resp     Temp     Ht Rate     Temp 2     Vitals shown include unvalidated device data.      Electronically Signed By: Annalisa Almazan MD, 2019, 12:35 PM

## 2019-07-09 NOTE — BRIEF OP NOTE
[facility], [place]    Brief Operative Note    Pre-operative diagnosis: right inguinal hernia  Post-operative diagnosis Right inguinal hernia un-incarcerated or not strangulated  Procedure: Procedure(s):  Right Open Inguinal Hernia Repair with Mesh  Surgeon: Surgeon(s) and Role:     * Emigdio Abrams MD - Primary     * John Georges MD - Resident - Assisting     * Rico Do MD - Resident - Assisting  Anesthesia: General   Estimated blood loss: Less than 10 ml  Drains: None  Specimens:   ID Type Source Tests Collected by Time Destination   A : Right Inguinal Hernia Sac, and Ilioinguinal Nerve Right, Lipoma Cord Tissue Hernia Sac SURGICAL PATHOLOGY EXAM Emigdio Abrams MD 7/9/2019 10:14 AM      Findings:   Indirect hernia and component of direct hernia as evident by redundant sac. Part of bowel herniating through the inguinal canal that was reduced. .  Complications: None.  Implants:    Implant Name Type Inv. Item Serial No.  Lot No. LRB No. Used   MESH KNITTED POLYPROPYLENE 06X6&quot; MARLEX [id number] Mesh MESH KNITTED POLYPROPYLENE 06X6&quot; MARLEX [id number]  [employer] [id number] Right 1

## 2019-07-09 NOTE — PLAN OF CARE
Vital signs:  Temp: 97.2  F (36.2  C) Temp src: Oral BP: 119/69 Pulse: 76 Heart Rate: 69 Resp: 18 SpO2: 98 % O2 Device: None (Room air)       Activity: Wheel chair bound. Pt independent with transfers.   Neuros: A&O x4.   Cardiac: WDL.   Respiratory: WDL on RA. Denies SOB.  GI/: Voided 325 mL via urinal, UA sent. +BS, passing flatus.   Diet: NPO ex meds.   Lines: Left PIV infusing LR @ 100 mL/hr.   Pain/nausea: Denies.   Plan: OR for hernia repair today.

## 2019-07-09 NOTE — ANESTHESIA PROCEDURE NOTES
Peripheral Nerve Block Procedure Note    Staff:     Anesthesiologist:  Alec Hernandez MD    Resident/CRNA:  Robbie Sanchez MD    Block performed by resident/CRNA in the presence of a teaching physician    Location: Pre-op  Procedure Start/Stop TImes:      7/9/2019 9:10 AM     7/9/2019 9:20 AM    patient identified, IV checked, site marked, risks and benefits discussed, informed consent, monitors and equipment checked, pre-op evaluation, at physician/surgeon's request and post-op pain management      Correct Patient: Yes      Correct Position: Yes      Correct Site: Yes      Correct Procedure: Yes      Correct Laterality:  Yes    Site Marked:  Yes  Procedure details:     Procedure:  TAP (Right TAP and Right Ilioinguinal)    ASA:  2    Laterality:  Right    Position:  Supine    Sterile Prep: chloraprep      Local skin infiltration:  None    Needle:  Short bevel    Needle gauge:  21    Needle length (mm):  110    Ultrasound: Yes      Ultrasound used to identify targeted nerve, plexus, or vascular structure and placed a needle adjacent to it      Permanent Image entered into patiient's record      Abnormal pain on injection: No      Blood Aspirated: No      Paresthesias:  No    Bleeding at site: No      Test dose negative for signs of intravascular injection: Yes      Bolus via:  Needle    Infusion Method:  Single Shot    Blood aspirated via catheter: No      Complications:  None

## 2019-07-09 NOTE — DISCHARGE SUMMARY
[facility]  Discharge Summary  General Surgery     Isaías Fishman MRN# [id number]   YOB: 1975 Age: 43 year old     Date of Admission:  7/8/2019  Date of Discharge::  7/10/2019  Admitting Physician:  Emigdio Abrams MD  Discharge Physician:  Dr. Abrams  Primary Care Physician:        Crystal Anthony          Admission Diagnoses:   Non-recurrent unilateral inguinal hernia without obstruction or gangrene [K40.90]          Discharge Diagnosis:   Recurrent right sided inguinal hernia non-gangrenous         Procedures:   Procedure(s):  Right Open Inguinal Hernia Repair with Mesh          Consultations:   MEDICATION HISTORY IP PHARMACY CONSULT          Brief History of Illness:   43 year old male with hx cerebral palsy and reducible right sided inguinal hernia , GERD, who presented with 1-2 days of groin pain. The pain is localized in right groin. He had an inguinal hernia, which was spontaneously reducible, however, the morning of admission he had a recurrence of hernia and was irreducible. He was nauseated but no emesis. Last BM and flatus was a day PTA. He has not been eating or drinking anything due to persistent groin pain. He denies any fevers, night sweats or chills. No chest pain, sob, constipation/diarrhea or urinary symptoms           Hospital Course:   In the setting of recurrent hernia, pt decided to choose surgical intervention. Pt was taken to the OR for open right inguinal hernia repair. The patient underwent the above procedure on 07/09/2019, which he tolerated well without immediate complications. He was transferred to the PACU and then floor for routine postoperative care. The remainder of his postoperative course was unremarkable. He had return of bowel function on POD#1 and his diet was slowly advanced. On the day of discharge, he was tolerating a low residue diet, his pain was well controlled with oral pain medications, he was voiding spontaneously, and ambulating  with wheelchair assist. Patient with follow up with Dr. Abrams in general surgery clinic in 3-4 weeks.           Imaging Studies:     Results for orders placed or performed during the hospital encounter of 07/08/19   CT Abdomen Pelvis w Contrast     Value    Radiologist flags See impression (Urgent)    Narrative    EXAMINATION: CT ABDOMEN PELVIS W CONTRAST, 7/8/2019 3:49 PM    TECHNIQUE:  Helical CT images from the lung bases through the  symphysis pubis were obtained with IV contrast. Contrast dose:  iopamidol (ISOVUE-370) solution 93 mL    COMPARISON: None available    HISTORY: mid abdominal pain    FINDINGS:    There is a large right indirect inguinal hernia containing a  borderline dilated knuckle of transverse colon measuring up to 4.2 cm  as well as omentum. There is a small amount of ascites within the  hernia sac. There is feculent material with mucosal hyperenhancement  of the herniated loop. This loop of bowel appears to tether the  mesocolon as well as the gastric antrum/pylorus inferiorly is  demonstrated on series 3, image 28. The transition points of the  entrance and extra loops appear to be narrowed as demonstrated on  series 5, image 370. Hernia neck measures 3.1 cm in width. There are  no significantly dilated loops of bowel upstream from this bowel  herniation. Small amount of fluid seen in the hernia sac. No  pneumatosis.    Remainder of the stomach and colon are within normal limits. Small  bowel appears normal.    Liver: Normal parenchymal attenuation without focal mass.  Biliary system: Gallbladder is within normal limits. No intrahepatic  or extrahepatic biliary ductal dilatation.  Pancreas: No focal mass or dilation of the main pancreatic duct.  Spleen: Within normal limits.  Adrenal glands: Within normal limits.  Kidneys: No focal mass mass or hydronephrosis. Punctate calyceal tip  stone in the superior and interpolar regions of the right kidney.  Bladder: Within normal  limits.  Reproductive organs: Within normal limits.  Appendix: Normal.  Lymph nodes: No intra-abdominal or pelvic lymphadenopathy.  Vasculature: Within normal limits.    Lung bases: Clear.    Bones and soft tissues: No suspicious soft tissue mass or fluid  collection. No suspicious osseous lesion.      Impression    IMPRESSION:   1. Right-sided indirect inguinal hernia containing a loop of  transverse colon producing a significant amount of inferior tethering  of the mesocolon and small amount of fluid indicating inflammation.  Correlate for incarceration.    [Urgent Result: See impression]    Finding was identified on 7/8/2019 3:53 PM.     Dr. Witt was contacted by Dr. Hernandez at 7/8/2019 4:09 PM and  verbalized understanding of the urgent finding.     I have personally reviewed the examination and initial interpretation  and I agree with the findings.    QUITA CALLAHAN MD   POC US Guidance Needle Placement    Impression    Right TAP and Right Ilioinguinal              Medications Prior to Admission:     Medications Prior to Admission   Medication Sig Dispense Refill Last Dose     fish oil-omega-3 fatty acids 1000 MG capsule Take 2 g by mouth daily Take 2 capsules (2000mg) by mouth once daily.   7/8/2019 at AM     ketoconazole (NIZORAL) 2 % external cream Apply topically 2 times daily Apply topically 1-2 times daily as needed. Use alone or in combination with Zeasorb (miconazole powder) as needed.   7/8/2019 at AM     lisinopril-hydrochlorothiazide (PRINZIDE/ZESTORETIC) 20-12.5 MG tablet Take 1 tablet by mouth daily   7/8/2019 at AM     miconazole (MICATIN/MICRO GUARD) 2 % external powder Apply topically as needed Use topically 1-2 times daily as needed alone or in combination with ketoconazole cream as needed.   7/8/2019 at AM     Multiple Vitamins-Minerals (MULTIVITAMIN ADULT PO) Take 1 tablet by mouth daily   7/8/2019 at AM     pantoprazole (PROTONIX) 40 MG EC tablet Take 40 mg by mouth daily   7/8/2019  at AM     sucralfate (CARAFATE) 1 GM tablet Take 1 g by mouth 4 times daily   7/8/2019 at AM              Discharge Medications:     Current Discharge Medication List      START taking these medications    Details   HYDROcodone-acetaminophen (NORCO) 5-325 MG tablet Take 1 tablet by mouth every 4 hours as needed for moderate to severe pain  Qty: 10 tablet, Refills: 0    Associated Diagnoses: Inguinal hernia of right side without obstruction or gangrene         CONTINUE these medications which have NOT CHANGED    Details   fish oil-omega-3 fatty acids 1000 MG capsule Take 2 g by mouth daily Take 2 capsules (2000mg) by mouth once daily.      ketoconazole (NIZORAL) 2 % external cream Apply topically 2 times daily Apply topically 1-2 times daily as needed. Use alone or in combination with Zeasorb (miconazole powder) as needed.      lisinopril-hydrochlorothiazide (PRINZIDE/ZESTORETIC) 20-12.5 MG tablet Take 1 tablet by mouth daily      miconazole (MICATIN/MICRO GUARD) 2 % external powder Apply topically as needed Use topically 1-2 times daily as needed alone or in combination with ketoconazole cream as needed.      Multiple Vitamins-Minerals (MULTIVITAMIN ADULT PO) Take 1 tablet by mouth daily      pantoprazole (PROTONIX) 40 MG EC tablet Take 40 mg by mouth daily      sucralfate (CARAFATE) 1 GM tablet Take 1 g by mouth 4 times daily                     Medications Discontinued or Adjusted During This Hospitalization:   No change           Antibiotics Prescribed at Discharge:   None prescribed           Day of Discharge Physical Exam:   Temp:  [96  F (35.6  C)-98.4  F (36.9  C)] 98.3  F (36.8  C)  Pulse:  [70-93] 92  Heart Rate:  [] 64  Resp:  [10-16] 16  BP: (109-149)/(53-97) 112/65  SpO2:  [92 %-100 %] 99 %    General: awake, alert, no acute distress, laying comfortably in bed   CV: warm, well perfused   Pulm: breathing comfortably on room air   Abdomen: soft, non-distended, appropriately tender, no rebound or  guarding;  Incision clean/dry/intact   Extremities: no edema, moving all extremities spontaneously and without apparent deficit            Final Pathology Result:   Pending (lipoma of cord)           Discharge Instructions and Follow-Up:     Discharge Procedure Orders   Reason for your hospital stay   Order Comments: You were hospitalized for an inguinal hernia repair.  Your hernia was reduced in the ED and you had surgery to repair the hernia on 7/9.     Activity   Order Comments: Your activity upon discharge: no heavy lifting for 8 weeks     Order Specific Question Answer Comments   Is discharge order? Yes      Discharge Instructions   Order Comments: May start general diet immediately.    Do not lift more than 20 pounds for 6-8 weeks after surgery.     You may shower after surgery but do not scrub incisions; simply let soapy water run over them. Pat area dry.     If you have steri-strips or glue over the incisions, these items will fall off on their own and do not need to be replaced.    Do not soak in a bath tub or pool for 6 weeks after surgery.    No driving, no using heavy machinery and no major decision-making while taking narcotic pain medication. It is illegal to drive while taking narcotic pain medication. Narcotics can cause constipation. Take stool softener while taking narcotic pain medication. If no bowel movement for 2 days add a laxative to aid in bowel movement. You can obtain a laxative from your pharmacyst over the counter.     You may take Tylenol (acetaminophen) and/or Motrin (ibuprofen) in addition or instead of narcotic medication; it is helpful to take these medications as you wean down off of the narcotic medications. If you have been prescribed Percocet, be aware that it contains Tylenol and oxycodone together. Do not take a total of more than 3500 mg of Tylenol per 24 hours to avoid liver damage.    Please call if you experience increasing abdominal pain, nausea, vomiting, increasing  drainage from your wounds, chills, or fever >101.5.    If you have questions about your follow-up appointment, please call the General Surgery Clinic at [phone].  Call [phone] and ask to speak with surgery resident if you are having troubles in the evenings, at night, or on weekends.     Follow Up ([facility])   Order Comments: Follow up with Dr. Abrams , in general surgery clinic ([facility]), within 3-4 weeks  to evaluate after surgery. No follow up labs or test are needed.    Appointments on [place] and/or [facility] (with [facility] or [facility] provider or service). Call [phone] if you haven't heard regarding these appointments within 7 days of discharge.     Diet   Order Comments: Follow this diet upon discharge: Regular     Order Specific Question Answer Comments   Is discharge order? Yes               Home Health Care:     Not needed           Discharge Disposition:     Discharged to home      Condition at discharge: Stable    Patient was seen, examined, and discussed on day of discharge with chief resident, who discussed with staff surgeon Dr. Abrams.    Rico Do MD  General Surgery PGY-2  [phone]

## 2019-07-09 NOTE — PROGRESS NOTES
Upon arrival to  from ED, writer notified Dr. Staley of arrival and requested orders for observation goals.

## 2019-07-10 VITALS
WEIGHT: 149.7 LBS | HEART RATE: 92 BPM | DIASTOLIC BLOOD PRESSURE: 69 MMHG | BODY MASS INDEX: 25.7 KG/M2 | SYSTOLIC BLOOD PRESSURE: 115 MMHG | TEMPERATURE: 97.8 F | OXYGEN SATURATION: 96 % | RESPIRATION RATE: 16 BRPM

## 2019-07-10 PROCEDURE — 25000132 ZZH RX MED GY IP 250 OP 250 PS 637: Performed by: STUDENT IN AN ORGANIZED HEALTH CARE EDUCATION/TRAINING PROGRAM

## 2019-07-10 PROCEDURE — G0378 HOSPITAL OBSERVATION PER HR: HCPCS

## 2019-07-10 RX ADMIN — OXYCODONE HYDROCHLORIDE 10 MG: 5 TABLET ORAL at 00:05

## 2019-07-10 RX ADMIN — OXYCODONE HYDROCHLORIDE 10 MG: 5 TABLET ORAL at 04:08

## 2019-07-10 RX ADMIN — ACETAMINOPHEN 975 MG: 325 TABLET, FILM COATED ORAL at 05:57

## 2019-07-10 RX ADMIN — HYDROCODONE BITARTRATE AND ACETAMINOPHEN 2 TABLET: 5; 325 TABLET ORAL at 12:45

## 2019-07-10 ASSESSMENT — ACTIVITIES OF DAILY LIVING (ADL)
ADLS_ACUITY_SCORE: 15

## 2019-07-10 NOTE — PROGRESS NOTES
"  Care Coordinator Progress Note    Admission Date/Time:  7/8/2019  Attending MD:  Emigdio Abrams MD    Data  Chart reviewed, discussed with interdisciplinary team.   Patient was admitted for:    Non-recurrent unilateral inguinal hernia without obstruction or gangrene  Inguinal hernia of right side without obstruction or gangrene.    Concerns with insurance coverage for discharge needs: None.  Current Living Situation: Patient lives alone.  Support System: Supportive and Involved  Services Involved: no services involved  Transportation at Discharge: Family or friend will provide  Barriers to Discharge: no barriers identified    Coordination of Care and Referrals: Provided patient/family with options for Housekeeping/ Chore Agency.        Assessment  Patient is a 43 year old male with history of cerebral palsy who was admitted with a right sided inguinal hernia and is now s/p right open inguinal hernia repair with mesh.  Per MD team patient is medically ready for discharge to home today.  Met with patient at bedside to introduce RNCC role and discuss discharge planning.  Artie is w/c bound and transfers independently.  Patient lives alone in an apartment with no services.  Patient is interested in having someone come help him after surgery because he \"doesn't want his house to fall apart\".  He is looking for someone to help him with cleaning and laundry.  Patient does not have insurance that would cover these services.  Patient willing to private pay and was interested in getting a quote on how much it would cost.  Called [employer](P: [phone]) who reported that they have a minimum of 4 hours of care for $35/hour.  This information was given the patient and he plans on arranging this if needed.  Patients Mom will transport him to home.       Plan  Anticipated Discharge Date:  7/10/2019  Anticipated Discharge Plan:  Home with OP f/u    RENATA Quiroga  Phone: [phone]  Pager: " [phone]  To contact weekend RNCC, dial [phone] and enter pager number [phone] at prompt. This pager can not be contacted by text page or outside line.

## 2019-07-10 NOTE — OP NOTE
Procedure Date: 07/09/2019      PREOPERATIVE DIAGNOSIS:  Right inguinal hernia, reducible.      POSTOPERATIVE DIAGNOSIS:  Right inguinal hernia, reducible.      PROCEDURE:  Open right inguinal hernia repair with mesh.      STAFF:  Emigdio Abrams MD      RESIDENT:  John Georges MD      ASSISTANT SURGEON:  Rico Do MD      CLINICAL HISTORY:  Isaías Fishman is a 43-year-old male who presents with a large right groin hernia that was reduced in the emergency room.  The patient was interested to pursue repair.  He has not noticed this hernia before.  The patient was interested for repair.  He was given full informed consent regarding the risks and benefits of surgery including potential for injury to the bowel that was present within the hernia sac, which was large.  This was noted to be transverse colon on CT.  The patient was also informed the potential for recurrence of hernia, infection, bleeding, need for second surgery, injury to the right testis and the potential for seroma, hematoma, and hernia recurrence, which was significant due to the large size of the hernia.  The patient understood the anesthesia risks including myocardial infarction, pulmonary emboli, stroke, even death; the patient understood and he wished to proceed.      PROCEDURE:  Isaías Fishman was taken to the main operating room under general endotracheal anesthesia.  The patient was prepped and draped in sterile fashion.  Following appropriate timeout procedure to assure patient identification and procedure, an approximately 10 cm incision was made in the right groin, taken down to the level of subcutaneous tissue with electrocautery.  The Samantha's fascia was divided and the fibers of the external oblique were identified.  The fibers of the right external oblique were opened up with the #15 blade and Metzenbaum scissor. The hernia sac and entire cord contents were encircled using a Penrose drain at the level of the pubic tubercle.  The  right ilioinguinal nerve was excised to avoid prolonged postoperative pain,  far up into the proximal aspect of the dissection and ligated with 5-0 Prolene suture cut with the #15 blade. A large hernia sac was present within the right groin.  This hernia sac was dissected free from cord and the associated fat and connective tissue in the inguinal canal.  The hernia sac was closely adherent to the right vas deferens which was protected throughout the procedure.    The hernia sac was then dissected off the cord contents; the hernia sac was then opened and viable adherent omentum was noted. This was taken down with blunt dissection.  All contents of the abdomen were replaced in the abdominal cavity and the open sac was carefully inspected.  An 0 silk purse string suture was placed at the level of the internal ring to perform high ligation. The hernia sac was then dissected off the stump of the high ligation using electrocautery.  Hemostasis was excellent.  The high ligation of the peritoneum  then pushed below the floor of the inguinal canal.  The patient had a very weak floor; but no direct sac was noted.  A plug and patch repair was performed using polypropylene mesh.  The plug was secured in the floor of the right inguinal canal using 2-0 Prolene suture to the shelving edge of the inguinal ligament as well as to the conjoined tendon.  Following this, the patch of polypropylene suture was then placed at the pubic tubercle and secured with interrupted 2-0 Vicryl suture along the inguinal ligament and the conjoined tendon.  A new deep ring was created that permitted just the tip of the finger to enter along side the spermatic cord and the mesh was secured in proper orientation and secured around the lateral and superior edge of the cord using 2-0 Prolene suture.  The mesh was reinspected, irrigated with sterile normal saline. The fibers of the external oblique were reapproximated with running 3-0 Vicryl suture.  The  wound was irrigated with sterile normal saline. The nerve associated with a lipoma of the cord as well as hernia sac were all submitted to Pathology in a single container.  At this point, attention was then directed to reapproximating Samantha's fascia with interrupted 3-0 Vicryl suture.  Skin edges were then reapproximated with interrupted 3-0 Vicryl, running subcuticular 4-0 Monocryl, and skin sealant.  The patient tolerated the procedure well.  Needle and sponge count correct x 2 and the patient was returned to postanesthesia recovery in good condition.         QUYNH BLACKWELL MD             D: 07/10/2019   T: 07/10/2019   MT: MAGON      Name:     MIRIAM FUNK   MRN:      [id number]        Account:        [id number]   :      1975           Procedure Date: 2019      Document: [id number]       cc: [facility] Surgery Billing

## 2019-07-10 NOTE — PLAN OF CARE
/62 (BP Location: Right arm)   Pulse 92   Temp 97.9  F (36.6  C) (Oral)   Resp 16   Wt 67.9 kg (149 lb 11.2 oz)   SpO2 94%   BMI 25.70 kg/m      9500-8139  VSS on RA, no s/s of distress. A/Ox4, pleasant and cooperative with cares; very happy, grateful and motivated for discharge. Denied pain--right groin incision liquid bandaged, JAY JAY/CDI; pain adequately managed with scheduled Tylenol. LPIV patent with LR@100ml/hr. RPIV SL. Up x1/walker; tolerated transfer from his personal wheelchair to bed well, without complaints (does endorse RLE weakness). Denied n/v--on full liquid diet; tolerating well, no complaints. Voiding adequately via urinal; pt reports not passing gas, no BMs this shift. Otherwise, pt resting most of shift, watching television. Probable discharge tomorrow. call light and belongings within reach. Will continue to monitor and continue POC/notify team as needed.

## 2019-07-10 NOTE — PLAN OF CARE
Vital signs:  Temp: 98.3  F (36.8  C) Temp src: Oral BP: 112/65 Pulse: 92 Heart Rate: 64 Resp: 16 SpO2: 99 % O2 Device: None (Room air)     Activity: Pt can transfer independently to and from wheelchair. Walker with Ax1 when not using wheelchair.   Neuros: A&O x4.   Cardiac: WDL.   Respiratory: WDL on RA. Denies SOB.  GI/: Voiding adequate amounts via urinal. +BS, passing flatus.   Diet: Full liquids.  Lines: Left PIV infusing LR @ 100 mL/hr.   Pain/nausea: Oxy given x2. Denies nausea.  Plan: Likely discharge to home.

## 2019-07-10 NOTE — PLAN OF CARE
Pt discharged home with mother. IV removed. Went over AVS with pt. No questions. Pt with own wc ind to pharmacy and front door.

## 2019-07-11 ENCOUNTER — PATIENT OUTREACH (OUTPATIENT)
Dept: SURGERY | Facility: CLINIC | Age: 44
End: 2019-07-11

## 2019-07-11 LAB — COPATH REPORT: NORMAL

## 2019-07-11 NOTE — PROGRESS NOTES
RN Post-Op/Post-Discharge Care Coordination Note    Mr. Isaías Fishman is a 43 year old male who underwent Open right inguinal hernia repair on 7/9 with  Dr. Emigdio Abrams.  Spoke with Patient.    Support  Patient able to care for self independently     Health Status  Fevers/chills: Patient denies any fever or chills.  Nausea/Vomiting: Patient denies nausea/vomiting.  Eating/drinking: Patient is able to eat and drink without any complaints.  Bowel habits: Patient reports no bowel movement since surgery. Is is passing a small amount of gas. He states he will start eating prunes. Discussed that he should use and OTC stool softener until he feels his bowels return to normal.  Drains (PERNELL): N/A  Incisions: Patient denies any signs and symptoms of infection..  Wound closure:  Skin Sealant  Pain: he is using Norco 2-3 times/day. Discussed he may also use Ibuprofen to help with pain. He can use this Q6H prn.  New Medications:  Norco    Activity/Restrictions  No lifting in excess of 15-20 pounds for 3-4 weeks    Equipment  None    Pathology reviewed with patient:  No: pending    Forms/Letters  No    All of his questions were answered including reviewing restrictions, and wound care.  He will call this office if he has any further questions and/or concerns.      In lieu of a post-op clinic patient that patient would like to be contacted in approximately 7-10 days via telephone.    A copy of this note was routed to the primary surgeon.      Whom and When to Call  Patient acknowledges understanding of how to manage any medication changes and   when to seek medical care.     Patient advised that if after hour medical concerns arise to please call [phone] and choose option 4 to speak to the physician on call.

## 2019-07-19 ENCOUNTER — PATIENT OUTREACH (OUTPATIENT)
Dept: SURGERY | Facility: CLINIC | Age: 44
End: 2019-07-19

## 2019-07-19 NOTE — PROGRESS NOTES
Isaías Funk is a patient of Dr. Quynh Abrams that recently underwent a surgical procedure.  The patient was contacted via telephone approximately 7-10 days ago for a status update and post-op teaching.  In lieu of a clinic visit, the patient requested to be contacted at a later date by an RN for an assessment.    Attempted to contact patient via telephone for a status update.  LM on VM to call office.        Patient Name: ISAÍAS FUNK   MR#: [id number]   Specimen #: [id number]   Collected: 7/9/2019   Received: 7/9/2019   Reported: 7/11/2019 17:02   Ordering Phy(s): QUYNH ABRAMS     For improved result formatting, select 'View Enhanced Report Format' under    Linked Documents section.     SPECIMEN(S):   Right inguinal hernia sac, right ilioinguinal nerve, cord lipoma     FINAL DIAGNOSIS:   Right inguinal hernia sac, right ilioinguinal nerve, cord lipoma excision:   - Fragments of mesothelial lined fibroadipose tissue consistent with   hernial sac and cord lipoma     I have personally reviewed all specimens and/or slides, including the   listed special stains, and used them   with my medical judgement to determine or confirm the final diagnosis.     Electronically signed out by:     Lyubov Ramos M.D., [facility]

## 2019-07-26 NOTE — PROGRESS NOTES
Isaías Fishman was contacted several days post procedure via telephone for a status update and elected to have a telephone follow -up call approximately 10 days after original contact in lieu of a clinic visit with Dr. Emigdio Abrams.  He continues to do well and the skin sealant has peeled off.  The patients wounds are healed and the area is C/D/I.  He is afebrile, mostly pain free, and carmen po; the patient is slowly resuming his normal activity. He had been taking Advil since the weekend but has stopped and feels it is not needed. Discussed lifting restrictions for 3-4 weeks.      Pathology was reviewed with the patient: Yes: reviewed    All of Isaías Fishman question's were answered and  she would like to return to the clinic on a PRN basis.      A copy of this note was routed to the patients surgeon.

## 2022-08-12 ENCOUNTER — TRANSCRIBE ORDERS (OUTPATIENT)
Dept: OTHER | Age: 47
End: 2022-08-12

## 2022-08-12 DIAGNOSIS — G80.9 CEREBRAL PALSY, UNSPECIFIED (H): Primary | ICD-10-CM

## 2022-11-30 ENCOUNTER — HOSPITAL ENCOUNTER (OUTPATIENT)
Dept: OCCUPATIONAL THERAPY | Facility: CLINIC | Age: 47
Setting detail: THERAPIES SERIES
Discharge: HOME | End: 2022-11-30
Attending: STUDENT IN AN ORGANIZED HEALTH CARE EDUCATION/TRAINING PROGRAM
Payer: COMMERCIAL

## 2022-11-30 PROCEDURE — 97542 WHEELCHAIR MNGMENT TRAINING: CPT | Mod: GO | Performed by: OCCUPATIONAL THERAPIST

## 2022-11-30 NOTE — PROGRESS NOTES
"   SEATING AND WHEELED MOBILITY ASSESSMENT  11/30/22 0800   Quick Adds   Quick Adds Current Manual Wheelchair   General Information    Rehab Discipline OT   Funding [facility]   Service Outpatient;Occupational Therapy;Seating/Wheeled Mobility Evaluation   Height 5'4\"   Weight 149   Start Of Care Date 11/30/22   Referring Physician Byron Gee   Orders Evaluate And Treat As Indicated;Per Therapist Evaluation   Orders Date 08/12/21   Patient/Caregiver Goals Replacement manual wheelchair   Rehabilitation Technology Supplier Romain SOLANO from [facility]   Current Community Support Family/Friend Caregiver   Patient role/Employment history Employed   Fall Risk Screen   Fall screen completed by OT   Have you fallen 2 or more times in the past year? No   Have you fallen and had an injury in the past year? No   Is patient a fall risk? No   Medical History   Onset Of Illness/injury Or Date Of Surgery 8/12/21   Medical Diagnosis CP   Current Manual Wheelchair   Age [birth year]    [employer]   Cushion Gel   Wheelchair Back Solid Curved  (Kranthi 3)   Footrest Style V front   Settings Used Home;Outdoor Community Mobility;Primary Means of Transportation   Current Equipment Requires Replacement   Rationale for Equipment Changes Heavy duty full time use   Manual Wheelchair Comments Requires some modifications to optimize propulsion techniques such as changing center of gravity modifying leg rest to safely keep spastic legs on chair with propulsion and modifying wheels in order to have ADL impairments   Home Accessibility   Living Environment Apartment/condo   Primary Entrance Level;Elevator   All Rooms Wheelchair Accessible Yes   Home Accessibility Comments with friend   Community ADL   Transportation Adapted Vehicle;Car  (SUV)   Adapted Vehicle Features Adapted Driving Controls   Community Mobility Requirements Medical Appointments;Shopping;Work   Accessibility Requirements for Community Settings weekly lifetime and very " involved in wheelTwitJump ADL Comments [employer]  daily, places wheelchair in back and holds onto car to walk to seat   Cognitive/Visual/Hearing Status   Observations No Problems Observed During Evaluation   Vision Intact;Corrective Lenses   ADL Status   Feeding Independent   Grooming/Hygiene Independent   Dressing Independent   Toileting Independent   Bathing Independent   Meal Preparation Independent   Home Management Independent   Balance   Unsupported Sitting Balance Uses Upper Extremities for Balance   Sitting Balance in Chair Uses Upper Extremities for Balance   Standing Balance Uses Upper Extremities for Balance   Ambulation   Ambulation Non Ambulatory   Ambulation Comments takes a few steps to get into   Transfers   Transfer Assist Independent   Wheelchair Ability   Wheelchair Ability Quick Adds Manual Chair;Wheelchair Use   Manual Wheelchair Propulsion   Manual Wheelchair Propulsion Independent   Wheelchair Use   Ability to Perform Weight Shifts Independent   Bed Confined Without Wheelchair? Yes   Hours in Wheelchair Daily 12   Hours Spent Alone Daily 0   Neuromuscular   History of Pressure Sores No   Current Pressure Sores No   Coordination UE Impaired;LE Impaired  (spastic movement)   Tone Spasticity   Sensory Deficits Reported WNL   Sensation on Seating Surface Intact per Report   Head and Neck   Head and Neck Position Functional   Upper Extremities   UE ROM WFL   UE Strength 5/5   Dominance Left   Pelvis   Anterior/Posterior Pelvis Position Posterior Tilt;Partially Flexible   Trunk   Anterior/Posterior Trunk Position Increased Thoracic Kyphosis;Partially Flexible   Lower Extremities   LE ROM hips limited to 100 degrees active in sitting, knees lacking end ranges and ankles have minimal only and stay at neutral   LE Strength 2+/5   LE Comments Extensor spasms and lower extremitys   Patient Measurements   Other Per ATP   Education Assessment   Barriers to Learning Emotional    Preferred Learning Style Listening;Demonstration;Pictures/Video   Assessment/Plan   Criteria for Skilled Interventions Met Yes, Treatment Indicated   Treatment Diagnosis Impaired participation in MRADLS   Therapy Frequency Once   Planned Therapy Interventions Wheelchair Management/Propulsion Training   Planned Therapy Interventions Comments Determine need for replacement manual wheelchair with slight modifications in order to optimize propulsion techniques and to continue to allow for independence with MRADLS and IADLs community the mobility and paperwork.  Discussed possible changes and modifications to make set up more optimal for patient.   Risks and benefits of treatment have been explained Yes   Patient/family & other staff in agreement with plan of care Yes   Comments Trials at [facility] with quickie nitrum   Session Time   OT Wheelchair Management Minutes (69365) 45   Adult OT Eval Goals   OT Eval Goals (Adult) 1    OT Goal 1   Goal Identifier Replacement manual wheelchair   Goal Description Patient/family demonstrates understanding of equipment for independent mobility, including benefits/limitations;   Goal Progress met   Target Date 11/30/22   Date Met 11/30/22   Electronically signed by:  Alysa PRITCHARD/ALISHA, ATP      Occupational Therapist, Assistive   [phone]      fax: [phone]      [email or web address], [address]  [address]  [place]   [address]

## 2023-02-15 NOTE — PROGRESS NOTES
REQUISITION AND JUSTIFICATION FOR DURABLE MEDICAL EQUIPMENT    Patient Name:  Isaías Fishman  MR #:  [id number]  :  1975  Age/Gender:  47 year old male  Visit Date:  Isaías Fishman seen for seating and wheeled mobility evaluation by Alysa Mejía OTR/L,ATP and ATP from [facility] on 22.    CLINICAL CRITERIA FOR MOBILITY ASSISTIVE EQUIPMENT  Coverage Criteria Per Local Coverage Determination  A) Isaías has mobility limitations due to cerebral palsy that significantly impairs his ability to participate in all of his mobility-related activities of daily living (MRADL). Specifically affected are toileting (being able to get there in time to prevent accidents), dressing, and bathing (getting into the bathroom of designated place). Current equipment used is iOmando K5 manual wheelchair from . This patient needs the new equipment requested to be able to continue to allow for maximal independence with MRADLS and IADLs. Please see additional documentation in the seating and wheeled mobility report for details.   Isaías had a successful clinical trial here, and also a successful trial at home with the recommended equipment. Isaías is very willing and physically / cognitively able to use the recommended equipment to assist his with mobility-related activities of daily living and general mobility.  B) Isaías's mobility limitation cannot be sufficiently and safely resolved by the use of an appropriately fitted cane or walker because he is nonambulatory, only taking a couple steps into vehicle while holding on. Strength of legs is 2+/5 for one maximal repetition. Fatigue also impacts this patient's ability to ambulate, regardless of the gait aid.    C) Isaías does not have sufficient upper extremity function to self-propel a k1-4 manual wheelchair and requires on optimally-configured K5 ultralight weight manual wheelchair in his home to perform MRADLs full-time.  Strength of arms is 5/5.  D)  The need for  this equipment is LIFETIME.   RECOMMENDATIONS FOR MOBILITY BASE, SEATING SYSTEM AND COMPONENTS  Radha Castroum dual ultra lightweight manual wheelchair - this ultra light weight manual wheelchair is appropriate and necessary for him to be able to assist and complete all of his MRADLs within his residence. He has mobility limitations impacting his ability to ambulate independently or with any ambulation aid. He has had a thorough clinical evaluation, and this manual wheelchair is the best option for this patient.  Any less costly wheelchair option would be unable to accommodate anterior-posterior axle adjustments to maximize propulsion mechanics required for shoulder preservation in full-time, active manual wheelchair propeller.      Frog-leg suspension for casters - for smooth ride not to jar/shake him and absorb shock/vibration in order to assist with limiting extensor spasms that hold him out of his chair    Aluminum soft roll carlos wheels-needed to provide a jarring free ride with easily maneuvering chair    Seat surface-tension adjustable seat sling-needed in order to provide adjustable rigidity or conforming to manage pressure and reduce misalignment of hips due to asymmetries and spasticity    Platform carbon fiber footrests - for leg support with latest option available as well as significant strength and stability while supporting spastic lower extremities    Leg strap-needed for rearward and anterior leg support keeping legs on chair for safety with use    Aluminum side guards-needed in order to prevent skin and clothing from getting stuck in tires for safety with propulsion    Frame protector pads-needed in order to protect frame from dance and scratches and thus keeping integrity of chair longer    Compact wheel locks- allows for independence use of brakes for safety with chair use and prevent jarring of fingers with forward propulsion    ROHO low profile seat cushion - this pressure distribution and  positioning seat cushion will optimally  distribute seating pressures to prevent pressure ulcers, but also provide a stable base of support for him to use during MRADLs.    Kranthi 3 Solid curved and padded back support - firm and contoured back support is needed to support Romero's thoracolumbar area in an upright and midline position, with appropriate support pads as needed. This back support is essential to provide sufficient posterior support to maximize his postural alignment and minimize his tendency to develop scoliosis and other secondary complications.    This equipment is reasonable and necessary with reference to accepted standards of medical practice and treatment of this patient's condition and is not being recommended as a convenience item. Without this recommended equipment, he is highly likely to sustain injuries from falls, develop pressure sores or postural compensation, and/or be bed confined, which those costs far exceed the cost of the requested equipment.    Electronically signed by:  Alysa ERICKSON ATP      Occupational Therapist, Assistive   [phone]      fax: [phone]      [email or web address]  [email or web address] [place] [address]  February 15, 2023    I have read and concur with the above recommendations.    Physician Printed Name __________________________________________    Physician SIgnature  _____________________________________________    Date of SIgnature ______________________________    Physician Phone  ______________________________

## (undated) DEVICE — LINEN TOWEL PACK X6 WHITE 5487

## (undated) DEVICE — LINEN TOWEL PACK X5 5464

## (undated) DEVICE — ESU GROUND PAD ADULT W/CORD E7507

## (undated) DEVICE — SPONGE LAP 18X18" X8435

## (undated) DEVICE — SU PROLENE 5-0 RB-1DA 36"  8556H

## (undated) DEVICE — GLOVE PROTEXIS POWDER FREE SMT 7.5  2D72PT75X

## (undated) DEVICE — SU SILK 2-0 TIE 12X30" A305H

## (undated) DEVICE — Device

## (undated) DEVICE — PREP CHLORAPREP 26ML TINTED ORANGE  260815

## (undated) DEVICE — DRAIN PENROSE 3/8X18" LATEX 0918020

## (undated) DEVICE — ADH SKIN CLOSURE PREMIERPRO EXOFIN 1.0ML 3470

## (undated) DEVICE — DRAPE IOBAN INCISE 13X13" 6640EZ

## (undated) DEVICE — GLOVE PROTEXIS BLUE W/NEU-THERA 7.5  2D73EB75

## (undated) DEVICE — SU PROLENE 2-0 SHDA 36" 8523H

## (undated) DEVICE — SU MONOCRYL 4-0 PS-2 27" UND Y426H

## (undated) DEVICE — SU VICRYL 3-0 SH 27" J316H

## (undated) DEVICE — SU VICRYL 2-0 SH 27" UND J417H

## (undated) DEVICE — BLADE CLIPPER SGL USE 9680

## (undated) RX ORDER — LIDOCAINE HYDROCHLORIDE 20 MG/ML
INJECTION, SOLUTION EPIDURAL; INFILTRATION; INTRACAUDAL; PERINEURAL
Status: DISPENSED
Start: 2019-07-09

## (undated) RX ORDER — FENTANYL CITRATE 50 UG/ML
INJECTION, SOLUTION INTRAMUSCULAR; INTRAVENOUS
Status: DISPENSED
Start: 2019-07-09

## (undated) RX ORDER — HYDROMORPHONE HYDROCHLORIDE 1 MG/ML
INJECTION, SOLUTION INTRAMUSCULAR; INTRAVENOUS; SUBCUTANEOUS
Status: DISPENSED
Start: 2019-07-09

## (undated) RX ORDER — DEXAMETHASONE SODIUM PHOSPHATE 4 MG/ML
INJECTION, SOLUTION INTRA-ARTICULAR; INTRALESIONAL; INTRAMUSCULAR; INTRAVENOUS; SOFT TISSUE
Status: DISPENSED
Start: 2019-07-09

## (undated) RX ORDER — BUPIVACAINE HYDROCHLORIDE 2.5 MG/ML
INJECTION, SOLUTION EPIDURAL; INFILTRATION; INTRACAUDAL
Status: DISPENSED
Start: 2019-07-09

## (undated) RX ORDER — ONDANSETRON 2 MG/ML
INJECTION INTRAMUSCULAR; INTRAVENOUS
Status: DISPENSED
Start: 2019-07-09

## (undated) RX ORDER — PROPOFOL 10 MG/ML
INJECTION, EMULSION INTRAVENOUS
Status: DISPENSED
Start: 2019-07-09

## (undated) RX ORDER — GLYCOPYRROLATE 0.2 MG/ML
INJECTION, SOLUTION INTRAMUSCULAR; INTRAVENOUS
Status: DISPENSED
Start: 2019-07-09

## (undated) RX ORDER — LIDOCAINE HYDROCHLORIDE 10 MG/ML
INJECTION, SOLUTION EPIDURAL; INFILTRATION; INTRACAUDAL; PERINEURAL
Status: DISPENSED
Start: 2019-07-09